# Patient Record
Sex: MALE | Race: WHITE | NOT HISPANIC OR LATINO | Employment: OTHER | URBAN - METROPOLITAN AREA
[De-identification: names, ages, dates, MRNs, and addresses within clinical notes are randomized per-mention and may not be internally consistent; named-entity substitution may affect disease eponyms.]

---

## 2017-02-03 ENCOUNTER — PATIENT OUTREACH (OUTPATIENT)
Dept: HEALTH INFORMATION MANAGEMENT | Facility: OTHER | Age: 82
End: 2017-02-03

## 2017-02-03 NOTE — PROGRESS NOTES
Outcome: Scheduled and mailed reminder letter -- Demographics verified -- [facility] AWV    Attempt # Final    Annual Wellness Visit Scheduling  Scheduling Status: Scheduled      MyChart Activation:  Already Active

## 2017-03-29 ENCOUNTER — HOSPITAL ENCOUNTER (OUTPATIENT)
Dept: RADIOLOGY | Facility: MEDICAL CENTER | Age: 82
End: 2017-03-29

## 2017-03-29 ENCOUNTER — HOSPITAL ENCOUNTER (EMERGENCY)
Facility: MEDICAL CENTER | Age: 82
End: 2017-03-29
Attending: EMERGENCY MEDICINE
Payer: MEDICARE

## 2017-03-29 VITALS
RESPIRATION RATE: 14 BRPM | DIASTOLIC BLOOD PRESSURE: 71 MMHG | BODY MASS INDEX: 33.8 KG/M2 | SYSTOLIC BLOOD PRESSURE: 180 MMHG | HEIGHT: 64 IN | WEIGHT: 198 LBS | OXYGEN SATURATION: 91 % | HEART RATE: 76 BPM | TEMPERATURE: 98.6 F

## 2017-03-29 PROCEDURE — 99284 EMERGENCY DEPT VISIT MOD MDM: CPT

## 2017-03-29 RX ORDER — WARFARIN SODIUM 3 MG/1
3-4 TABLET ORAL
COMMUNITY
End: 2017-11-16

## 2017-03-29 RX ORDER — LISINOPRIL 10 MG/1
10 TABLET ORAL
COMMUNITY
End: 2018-08-20

## 2017-03-29 RX ORDER — FLUTICASONE PROPIONATE 50 MCG
1 SPRAY, SUSPENSION (ML) NASAL
COMMUNITY
End: 2018-10-17 | Stop reason: SDUPTHER

## 2017-03-29 ASSESSMENT — LIFESTYLE VARIABLES: DO YOU DRINK ALCOHOL: NO

## 2017-03-29 ASSESSMENT — ENCOUNTER SYMPTOMS
CONSTITUTIONAL NEGATIVE: 1
CARDIOVASCULAR NEGATIVE: 1
GASTROINTESTINAL NEGATIVE: 1
EYES NEGATIVE: 1
NEUROLOGICAL NEGATIVE: 1
TINGLING: 0
MUSCULOSKELETAL NEGATIVE: 1
RESPIRATORY NEGATIVE: 1
PSYCHIATRIC NEGATIVE: 1

## 2017-03-29 ASSESSMENT — PAIN SCALES - GENERAL: PAINLEVEL_OUTOF10: 5

## 2017-03-29 NOTE — ED AVS SNAPSHOT
3/29/2017          Devan Bean Jr.  [address] [place] [address] [place] [address]    Dear Devan:    [facility] wants to ensure your discharge home is safe and you or your loved ones have had all your questions answered regarding your care after you leave the hospital.    You may receive a telephone call within two days of your discharge.  This call is to make certain you understand your discharge instructions as well as ensure we provided you with the best care possible during your stay with us.     The call will only last approximately 3-5 minutes and will be done by a nurse.    Once again, we want to ensure your discharge home is safe and that you have a clear understanding of any next steps in your care.  If you have any questions or concerns, please do not hesitate to contact us, we are here for you.  Thank you for choosing [facility] for your healthcare needs.    Sincerely,    Rick Shah    [facility]

## 2017-03-29 NOTE — ED AVS SNAPSHOT
Managed Methods Access Code: Activation code not generated  Current Managed Methods Status: Active    Znap[employer]art  A secure, online tool to manage your health information     [employer]’s Managed Methods® is a secure, online tool that connects you to your personalized health information from the privacy of your home -- day or night - making it very easy for you to manage your healthcare. Once the activation process is completed, you can even access your medical information using the Managed Methods jean, which is available for free in the Apple Jean store or Google Play store.     Managed Methods provides the following levels of access (as shown below):   My Chart Features   [facility] Primary Care Doctor [facility]  Specialists [facility]  Urgent  Care Non-[facility]  Primary Care  Doctor   Email your healthcare team securely and privately 24/7 X X X X   Manage appointments: schedule your next appointment; view details of past/upcoming appointments X      Request prescription refills. X      View recent personal medical records, including lab and immunizations X X X X   View health record, including health history, allergies, medications X X X X   Read reports about your outpatient visits, procedures, consult and ER notes X X X X   See your discharge summary, which is a recap of your hospital and/or ER visit that includes your diagnosis, lab results, and care plan. X X       How to register for Managed Methods:  1. Go to  [email or web address].  2. Click on the Sign Up Now box, which takes you to the New Member Sign Up page. You will need to provide the following information:  a. Enter your Managed Methods Access Code exactly as it appears at the top of this page. (You will not need to use this code after you’ve completed the sign-up process. If you do not sign up before the expiration date, you must request a new code.)   b. Enter your date of birth.   c. Enter your home email address.   d. Click Submit, and follow the next screen’s instructions.  3. Create a Managed Methods ID. This will  be your [employer]ipit login ID and cannot be changed, so think of one that is secure and easy to remember.  4. Create a Yipit password. You can change your password at any time.  5. Enter your Password Reset Question and Answer. This can be used at a later time if you forget your password.   6. Enter your e-mail address. This allows you to receive e-mail notifications when new information is available in Yipit.  7. Click Sign Up. You can now view your health information.    For assistance activating your Yipit account, call [phone]

## 2017-03-29 NOTE — H&P
Physician H&P    Patient ID:  Devan Bean Jr.  [id number]  84 y.o. male  2/19/1933    History:  Primary Diagnosis: GLF trauma with cervical spine fracture    HPI:  Devan is an 84 year-old man who presents following a Ground Level Fall.  The patient reports that he was attempting to spray for ants wherein he tripped over a metal bar.  The patient states that he did not lose consciousness associated with the fall.  He denies any significant pain associated with the fall.  The patient is treated with warfarin    Past Medical History:  has a past medical history of Sleep apnea; Osteoarthritis; Hypertension; Gout; GERD (gastroesophageal reflux disease); Dyslipidemia; Factor 5 Leiden mutation, heterozygous (CMS-[facility]); Dental disorder; and Pain.  Past Surgical History:  has past surgical history that includes laminotomy (08012010); revise median n/carpal tunnel surg (Bilateral); and cyst excision (Right, 12/14/2015).  Past Social History:  reports that he quit smoking about 63 years ago. His smoking use included Cigarettes. He has a 10 pack-year smoking history. He has never used smokeless tobacco. He reports that he drinks about 8.4 oz of alcohol per week. He reports that he does not use illicit drugs.  Past Family History:   Family History   Problem Relation Age of Onset   • Heart Disease Mother    • Lung Disease Mother    • Diabetes Maternal Grandmother      Allergies: Pcn and Indocin    Current Medications:  Prior to Admission medications    Medication Sig Start Date End Date Taking? Authorizing Provider   fluticasone (FLONASE) 50 MCG/ACT nasal spray Spray 1 Spray in nose 1 time daily as needed (for allergies).   Yes Not In System Provider   lisinopril (PRINIVIL) 10 MG Tab Take 10 mg by mouth every bedtime.   Yes Not In System Provider   warfarin (COUMADIN) 3 MG Tab Take 3-4 mg by mouth every bedtime. 3/22-3/24 -HOLD, 3/25-4mg, 3/26-4mg, 3/27-4mg, 3/28-3mg   Yes Not In System Provider   hydrocodone-acetaminophen (NORCO)  "5-325 MG Tab per tablet Take 1 Tab by mouth 2 times a day as needed. 3/7/17   Sapna Randall D.O.   vitamin D (CHOLECALCIFEROL) 1000 UNIT Tab Take 1,000 Units by mouth every day.    Not In System Provider   cyanocobalamin (VITAMIN B-12) 500 MCG Tab Take 1,000 mcg by mouth every day.    Not In System Provider   Multiple Vitamins-Minerals (OCUVITE) Tab Take 1 Tab by mouth every day.    Not In System Provider   simvastatin (ZOCOR) 10 MG Tab Take 1 Tab by mouth every bedtime. 1/11/17   Sapna Randall D.O.   colchicine (COLCRYS) 0.6 MG Tab Take 1 Tab by mouth every day. 10/24/16   Sapna Randall D.O.   predniSONE (DELTASONE) 5 MG Tab TAKE 1 TABLET BY MOUTH EVERY DAY 9/26/16   Sapna Randall D.O.   esomeprazole (NEXIUM) 40 MG delayed-release capsule TAKE 1 CAP BY MOUTH EVERY DAY. 3/27/16   Sapna Randall D.O.   Omega-3 Fatty Acids (FISH OIL) 1000 MG CPDR Take 1,000 mg by mouth every day.    Sapna Randall D.O.   Ascorbic Acid (VITAMIN C CR) 1000 MG TBCR Take 1,000 mg by mouth every day.    Sapna Randall D.O.   Magnesium 400 MG CAPS Take 400 mg by mouth every day.    Not In System Provider       Review of Systems:  Review of Systems   Constitutional: Negative.    HENT: Negative.    Eyes: Negative.    Respiratory: Negative.    Cardiovascular: Negative.    Gastrointestinal: Negative.    Genitourinary: Negative.    Musculoskeletal: Negative.    Skin: Negative.    Neurological: Negative.    Endo/Heme/Allergies: Negative.    Psychiatric/Behavioral: Negative.      Blood pressure 180/71, pulse 83, temperature 37 °C (98.6 °F), resp. rate 16, height 1.626 m (5' 4\"), weight 89.812 kg (198 lb), SpO2 94 %.    Physical Examination:  Physical Exam   Constitutional: He is oriented to person, place, and time. He appears well-developed and well-nourished.   HENT:   Head: Normocephalic.   Small amount of abrasion along glabela and left temporal region   Eyes: EOM are normal. Pupils are equal, round, and reactive to light.   Neck: " Normal range of motion.   Neck for neck discomfort   Cardiovascular: Normal rate and regular rhythm.    Pulmonary/Chest: Effort normal.   Abdominal: Soft. He exhibits no distension. There is no tenderness.   Musculoskeletal: Normal range of motion. He exhibits no edema.   Abrasion to right hand that are wrapped in dressing  -5/5 motor strength throughout  -Intact to LT throughout  -No protonator drift   Neurological: He is alert and oriented to person, place, and time. He has normal reflexes. No cranial nerve deficit.       Impression:  GLF with finding of inferior Left C6 articulating process fracture.  This appears to have some degree of cortication along the fracture edges and may represent a subacute fracture rather than an acute fracture that occurred today.    Plan:  1. Cervical Fracture, subaxial spine, L C6 inferior articulating process, acute versus subacute.      -place patient in [place] J collar and plan to follow-up in 4 weeks  2. Anticoagulation with elevated INR, hold additional dosing of warfarin and follow-up with PCP/coumadin clinic regarding dose adjustment    Pre Procedure Assessment:  <EXAMSHORT2>      Pre Procedure update:   No changes.    Eduardo RIVKA Olivas  3/29/2017

## 2017-03-29 NOTE — ED NOTES
BIB [facility] c/o neck pain, patient exp a mglf ( tripped over a piece of metal on floor) in his garage this morning, no LOC, went to [facility] where a C6 fx was noted, denies any numbness or tingling to upper ext, is in c collar, o2 at 2 l w o2 sat 91-94%

## 2017-03-29 NOTE — ED PROVIDER NOTES
ED Provider Note    Scribed for Rajesh Caputo D.O. by Leon Retana. 3/29/2017  2:40 PM    Primary care provider: Sapna Randall D.O.  Means of arrival: Ambulance  History obtained from: patient  History limited by: none    CHIEF COMPLAINT  Chief Complaint   Patient presents with   • Neck Injury       HPI  Devan Bean Jr. is a 84 y.o. male who presents to the Emergency Department for evaluation neck pain secondary to a mechanical ground level fall, which occurred approximately 6 hours prior to arrival. Patient reports he tripped over a piece of metal on the floor in his garage. He also complains of associated right elbow pain and reports that he cannot straighten his elbow. Patient denies any numbness or tingling.  His tetanus is up to date. He has no exacerbating or alleviating factors.      REVIEW OF SYSTEMS  Review of Systems   Neurological: Negative for tingling.   All other systems reviewed and are negative.  C.     PAST MEDICAL HISTORY   has a past medical history of Sleep apnea; Osteoarthritis; Hypertension; Gout; GERD (gastroesophageal reflux disease); Dyslipidemia; Factor 5 Leiden mutation, heterozygous (CMS-[facility]); Dental disorder; and Pain.    SURGICAL HISTORY   has past surgical history that includes laminotomy (08012010); revise median n/carpal tunnel surg (Bilateral); and cyst excision (Right, 12/14/2015).    SOCIAL HISTORY  Social History   Substance Use Topics   • Smoking status: Former Smoker -- 1.00 packs/day for 10 years     Types: Cigarettes     Quit date: 11/10/1953   • Smokeless tobacco: Never Used   • Alcohol Use: 8.4 oz/week     14 Glasses of wine per week      History   Drug Use No       FAMILY HISTORY  Family History   Problem Relation Age of Onset   • Heart Disease Mother    • Lung Disease Mother    • Diabetes Maternal Grandmother        CURRENT MEDICATIONS  Home Medications     **Home medications have not yet been reviewed for this encounter**          ALLERGIES  Allergies  "  Allergen Reactions   • Pcn [Penicillins] Rash   • Indocin [Indometacin Sodium] Rash       PHYSICAL EXAM  VITAL SIGNS: /71 mmHg  Pulse 83  Temp(Src) 37 °C (98.6 °F)  Resp 16  Ht 1.626 m (5' 4\")  Wt 89.812 kg (198 lb)  BMI 33.97 kg/m2  SpO2 94%    Constitutional: Well developed, well nourished. No acute distress.  HEENT: Normocephalic. Superficial abrasions to the forehead and zygoma. Posterior pharynx clear and moist.  Eyes:  EOMI. Normal sclera.  Neck: C collar in place. Midline tenderness C-5 C-6.   Chest/Pulmonary: clear to ausculation. Symmetrical expansion.   Cardio: Regular rate and rhythm with no murmur.   Abdomen: Soft, nontender. No peritoneal signs. No guarding. No palpable masses.  Back: No CVA tenderness, nontender midline, no step offs.  Musculoskeletal: No deformity, no edema, neurovascular intact. Superficial abrasion to the left knee.   Neuro: Equal  bilaterally. Dorsi plantar flexion extension 4/5 bilateral lower extremities. Clear speech, appropriate, cooperative, cranial nerves II-XII grossly intact.  Skin skin tears to the dorsum of right hand and left elbow. Non suturable.   Psych: Normal mood and affect    DIAGNOSTIC STUDIES / PROCEDURES    RADIOLOGY  OUTSIDE IMAGES-DX LOWER EXTREMITY, LEFT   Preliminary Result      OUTSIDE IMAGES-DX UPPER EXTREMITY, RIGHT   Preliminary Result      OUTSIDE IMAGES-CT ABDOMEN /PELVIS   Preliminary Result      OUTSIDE IMAGES-CT CHEST   Preliminary Result      OUTSIDE IMAGES-CT FACE   Preliminary Result      OUTSIDE IMAGES-CT CERVICAL SPINE   Preliminary Result      OUTSIDE IMAGES-CT HEAD   Preliminary Result        The radiologist's interpretation of all radiological studies have been reviewed by me.    COURSE & MEDICAL DECISION MAKING  Pertinent Labs & Imaging studies reviewed. (See chart for details)          2:40 PM - Patient seen and examined at bedside.     2:42 PM - Obtained and reviewed past labs and CTs which indicated he takes Coumadin, " INR 5.1. Left knee X-ray negative. Right elbow X-ray negative. CT abdomen pelvis is also negative. CT chest is negative. CT of face is negative. CT head is negative. CT C spine shows a non displaced fracture involving inferior articular facet of C-6. The pedicle and lamina appear intact and right sided posterior elements of C-6 appear intact.     3:24 PM - Further reviewed imaging results.     3:27 PM Paged Dr. Olivas (Neurosurgery).     3:35 PM - I discussed the patient's case and the above findings with Dr. Olivas (Neurosurgery) who agrees to see the patient.     4:30 PM - Dr. Olivas has seen the pt.  He states pt can go home in [facility], and he will see this week.       5:21 PM-The pt has been up, ambulated, and has no dizziness or weakness.  No neuro deficits.  He is comfortable in going home.     DISPOSITION:  Pt will be discharged.       FINAL IMPRESSION  1. Compressed spine fracture, initial encounter (CMS-[facility])          Leon ADLER (Scribe), am scribing for, and in the presence of, Rajesh Caputo D.O..    Electronically signed by: Leon Retana (Scribe), 3/29/2017    Rajesh ADLER D.O. personally performed the services described in this documentation, as scribed by Leon Retana in my presence, and it is both accurate and complete.    The note accurately reflects work and decisions made by me.  Rajehs Caputo  3/29/2017  5:48 PM

## 2017-03-29 NOTE — ED NOTES
" 1/2\" Avulsion to bridge of nose , 1/2\" avulsion to lateral L eyebrow,  2 skin tears to  of R hand, abrasion to L knee,skin tear to L el;bow, and 1/4\" lac to L fifth digit middle joint  "

## 2017-03-29 NOTE — ED AVS SNAPSHOT
Home Care Instructions                                                                                                                Devan Bean Jr.   MRN: [id number]    Department:  [facility], Emergency Dept   Date of Visit:  3/29/2017            [facility], Emergency Dept    [address]    [place] [address]    Phone:  [phone]      You were seen by     1. Juarez Santos M.D.    2. Rajesh Caputo D.O.      Your Diagnosis Was     Compressed spine fracture, initial encounter (CMS-[facility])     M48.50XA       Follow-up Information     1. Follow up with Eduardo Olivas M.D..    Specialty:  Neurosurgery    Contact information    [address]  [address]  [facility] [id number] [phone]        Medication Information     Review all of your home medications and newly ordered medications with your primary doctor and/or pharmacist as soon as possible. Follow medication instructions as directed by your doctor and/or pharmacist.     Please keep your complete medication list with you and share with your physician. Update the information when medications are discontinued, doses are changed, or new medications (including over-the-counter products) are added; and carry medication information at all times in the event of emergency situations.               Medication List      ASK your doctor about these medications        Instructions    Morning Afternoon Evening Bedtime    Ascorbic Acid 1000 MG Tbcr        Take 1,000 mg by mouth every day.   Dose:  1000 mg                        colchicine 0.6 MG Tabs   Commonly known as:  COLCRYS        Take 1 Tab by mouth every day.   Dose:  0.6 mg                        cyanocobalamin 500 MCG Tabs   Commonly known as:  VITAMIN B-12        Take 1,000 mcg by mouth every day.   Dose:  1000 mcg                        esomeprazole 40 MG delayed-release capsule   Commonly known as:  NEXIUM        TAKE 1 CAP BY MOUTH EVERY DAY.                           fish oil 1000 MG Cpdr EC softgel capsule        Take 1,000 mg by mouth every day.   Dose:  1000 mg                        fluticasone 50 MCG/ACT nasal spray   Commonly known as:  FLONASE        Spray 1 Spray in nose 1 time daily as needed (for allergies).   Dose:  1 Spray                        hydrocodone-acetaminophen 5-325 MG Tabs per tablet   Commonly known as:  NORCO        Take 1 Tab by mouth 2 times a day as needed.   Dose:  1 Tab                        lisinopril 10 MG Tabs   Commonly known as:  PRINIVIL        Take 10 mg by mouth every bedtime.   Dose:  10 mg                        Magnesium 400 MG Caps        Take 400 mg by mouth every day.   Dose:  400 mg                        OCUVITE Tabs        Take 1 Tab by mouth every day.   Dose:  1 Tab                        predniSONE 5 MG Tabs   Commonly known as:  DELTASONE        TAKE 1 TABLET BY MOUTH EVERY DAY                        simvastatin 10 MG Tabs   Commonly known as:  ZOCOR        Take 1 Tab by mouth every bedtime.   Dose:  10 mg                        vitamin D 1000 UNIT Tabs   Commonly known as:  cholecalciferol        Take 1,000 Units by mouth every day.   Dose:  1000 Units                        warfarin 3 MG Tabs   Commonly known as:  COUMADIN        Take 3-4 mg by mouth every bedtime. 3/22-3/24 -HOLD, 3/25-4mg, 3/26-4mg, 3/27-4mg, 3/28-3mg   Dose:  3-4 mg                                Procedures and tests performed during your visit     OUTSIDE IMAGES-CT ABDOMEN /PELVIS    OUTSIDE IMAGES-CT CERVICAL SPINE    OUTSIDE IMAGES-CT CHEST    OUTSIDE IMAGES-CT FACE    OUTSIDE IMAGES-CT HEAD    OUTSIDE IMAGES-DX LOWER EXTREMITY, LEFT    OUTSIDE IMAGES-DX UPPER EXTREMITY, RIGHT    RIGID C COLLAR PLACEMENT            Patient Information     Patient Information    Following emergency treatment: all patient requiring follow-up care must return either to a private physician or a clinic if your condition worsens before you are able to obtain further  medical attention, please return to the emergency room.     Billing Information    At [facility], we work to make the billing process streamlined for our patients.  Our Representatives are here to answer any questions you may have regarding your hospital bill.  If you have insurance coverage and have supplied your insurance information to us, we will submit a claim to your insurer on your behalf.  Should you have any questions regarding your bill, we can be reached online or by phone as follows:  Online: You are able pay your bills online or live chat with our representatives about any billing questions you may have. We are here to help Monday - Friday from 8:00am to 7:30pm and 9:00am - 12:00pm on Saturdays.  Please visit [email or web address]  for more information.   Phone:  [phone] or [phone]    Please note that your emergency physician, surgeon, pathologist, radiologist, anesthesiologist, and other specialists are not employed by [facility] and will therefore bill separately for their services.  Please contact them directly for any questions concerning their bills at the numbers below:     Emergency Physician Services:  [phone]  [facility]:  [phone]  [facility]:  [phone]  [facility]:  [phone]    1. Your final bill may vary from the amount quoted upon discharge if all procedures are not complete at that time, or if your doctor has additional procedures of which we are not aware. You will receive an additional bill if you return to the Emergency Department at [facility] for suture removal regardless of the facility of which the sutures were placed.     2. Please arrange for settlement of this account at the emergency registration.    3. All self-pay accounts are due in full at the time of treatment.  If you are unable to meet this obligation then payment is expected within 4-5 days.     4. If you  have had radiology studies (CT, X-ray, Ultrasound, MRI), you have received a preliminary result during your emergency department visit. Please contact the radiology department [phone] to receive a copy of your final result. Please discuss the Final result with your primary physician or with the follow up physician provided.     Crisis Hotline:  [place] Crisis Hotline:  5-424-IENMPFV or 1-204.311.5223  [place] Crisis Hotline:    1-620.934.4829 or 475-300-3524         ED Discharge Follow Up Questions    1. In order to provide you with very good care, we would like to follow up with a phone call in the next few days.  May we have your permission to contact you?     YES /  NO    2. What is the best phone number to call you? (       )_____-__________    3. What is the best time to call you?      Morning  /  Afternoon  /  Evening                   Patient Signature:  ____________________________________________________________    Date:  ____________________________________________________________

## 2017-03-30 NOTE — DISCHARGE INSTRUCTIONS
Vertebral Fracture  A vertebral fracture is a break in one of the bones that make up the spine (vertebrae). The vertebrae are stacked on top of each other to form the spinal column. They support the body and protect the spinal cord. The vertebral column has an upper part (cervical spine), a middle part (thoracic spine), and a lower part (lumbar spine). Most vertebral fractures occur in the thoracic spine or lumbar spine.  There are three main types of vertebral fractures:  · Flexion fracture. This happens when vertebrae collapse. Vertebrae can collapse:  ¨ In the front (compression fracture). This type of fracture is common in people who have a condition that causes their bones to be weak and brittle (osteoporosis). The fracture can make a person lose height.  ¨ In the front and back (axial burst fracture).  · Extension fracture. This happens when an external force pulls apart the vertebrae.  · Rotation fracture. This happens when the spine bends extremely in one direction. This type can cause a piece of a vertebra to break off (transverse process fracture) or move out of its normal position (fracture dislocation). This type of fracture has a high risk for spinal cord injury.  Vertebral fractures can range from mild to very severe. The most severe types are those that cause the broken bones to move out of place (unstable) and those that injure or press on the spinal cord.  CAUSES  This condition is usually caused by a forceful injury. This type of injury commonly results from:  · Car accidents.  · Falling or jumping from a great height.  · Collisions in contact sports.  · Violent acts, such as an assault or a gunshot wound.  RISK FACTORS  This injury is more likely to happen to people who:  · Have osteoporosis.  · Participate in contact sports.  · Are in situations that could result in falls or other violent injuries.  SYMPTOMS  Symptoms of this injury depend on the location and the type of fracture. The most common  symptom is back pain that gets worse with movement. You may also have trouble standing or walking. If a fracture has damaged your spinal cord or is pressing on it, you may also have:  · Numbness.  · Tingling.  · Weakness.  · Loss of movement.  · Loss of bowel or bladder control.  DIAGNOSIS  This injury may be diagnosed based on symptoms, medical history, and a physical exam. You may also have imaging tests to confirm the diagnosis. These may include:  · Spine X-ray.  · CT scan.  · MRI.  TREATMENT  Treatment for this injury depends on the type of fracture. If your fracture is stable and does not affect your spinal cord, it may heal with nonsurgical treatment, such as:  · Taking pain medicine.  · Wearing a cast or a brace.  · Doing physical therapy exercises.  If your vertebral fracture is unstable or it affects your spinal cord, you may need surgical treatment, such as:  · Laminectomy. This procedure involves removing the part of a vertebra that is pushing on the spinal cord (spinal decompression surgery). Bone fragments may also be removed.  · Spinal fusion. This procedure is used to stabilize an unstable fracture. Vertebrae may be joined together with a piece of bone from another part of your body (graft) and held in place with rods, plates, or screws.  · Vertebroplasty. In this procedure, bone cement is used to rebuild collapsed vertebrae.  HOME CARE INSTRUCTIONS  General Instructions  · Take medicines only as directed by your health care provider.  · Do not drive or operate heavy machinery while taking pain medicine.  · If directed, apply ice to the injured area:  ¨ Put ice in a plastic bag.  ¨ Place a towel between your skin and the bag.  ¨ Leave the ice on for 30 minutes every two hours at first. Then apply the ice as needed.  · Wear your neck brace or back brace as directed by your health care provider.  · Do not drink alcohol. Alcohol can interfere with your treatment.  · Keep all follow-up visits as directed  by your health care provider. This is important. It can help to prevent permanent injury, disability, and long-lasting (chronic) pain.  Activity  · Stay in bed (on bed rest) only as directed by your health care provider. Being on bed rest for too long can make your condition worse.  · Return to your normal activities as directed by your health care provider. Ask what activities are safe for you.  · Do exercises to improve motion and strength in your back (physical therapy), as recommended by your health care provider.    · Exercise regularly as directed by your health care provider.  SEEK MEDICAL CARE IF:  · You have a fever.  · You develop a cough that makes your pain worse.  · Your pain medicine is not helping.  · Your pain does not get better over time.  · You cannot return to your normal activities as planned or expected.  SEEK IMMEDIATE MEDICAL CARE IF:  · Your pain is very bad and it suddenly gets worse.  · You are unable to move any body part (paralysis) that is below the level of your injury.  · You have numbness, tingling, or weakness in any body part that is below the level of your injury.  · You cannot control your bladder or bowels.     This information is not intended to replace advice given to you by your health care provider. Make sure you discuss any questions you have with your health care provider.     Document Released: 01/25/2006 Document Revised: 05/03/2016 Document Reviewed: 12/23/2015  [employer] Patient Education ©2016 [employer]

## 2017-03-30 NOTE — ED NOTES
IV dced, given discharge instructions and follow up, family at bedside wanting to talk to Dr Caputo, Dr. Caputo aware

## 2018-01-04 PROBLEM — E66.9 OBESITY (BMI 30-39.9): Status: ACTIVE | Noted: 2018-01-04

## 2018-08-20 PROBLEM — M1A.9XX1 TOPHACEOUS GOUT: Status: ACTIVE | Noted: 2018-08-20

## 2018-08-20 PROBLEM — M48.10 DISH (DIFFUSE IDIOPATHIC SKELETAL HYPEROSTOSIS): Status: ACTIVE | Noted: 2018-08-20

## 2019-02-05 ENCOUNTER — PATIENT OUTREACH (OUTPATIENT)
Dept: HEALTH INFORMATION MANAGEMENT | Facility: OTHER | Age: 84
End: 2019-02-05

## 2019-02-05 NOTE — PROGRESS NOTES
Outcome: No call made phone number not in service        Please transfer to Patient Outreach Team at [phone] when patient returns call.     Attempt # 1

## 2019-02-13 NOTE — PROGRESS NOTES
Outcome: Left Message    Please transfer to Patient Outreach Team at [phone] when patient returns call.    Attempt # 2

## 2019-02-27 NOTE — PROGRESS NOTES
Outcome: Left Message    Please transfer to Patient Outreach Team at [phone] when patient returns call.    Attempt # 3

## 2019-03-15 PROBLEM — J30.89 ENVIRONMENTAL AND SEASONAL ALLERGIES: Status: ACTIVE | Noted: 2019-03-15

## 2020-06-13 PROBLEM — N18.9 CKD (CHRONIC KIDNEY DISEASE): Status: ACTIVE | Noted: 2020-06-13

## 2020-06-13 PROBLEM — S22.079A CLOSED FRACTURE OF TENTH THORACIC VERTEBRA (HCC): Status: ACTIVE | Noted: 2020-06-13

## 2020-09-03 ENCOUNTER — NON-PROVIDER VISIT (OUTPATIENT)
Dept: NEUROLOGY | Facility: MEDICAL CENTER | Age: 85
End: 2020-09-03
Payer: MEDICARE

## 2020-09-03 DIAGNOSIS — G62.9 PERIPHERAL NERVE DISORDER: ICD-10-CM

## 2020-09-03 DIAGNOSIS — R20.0 NUMBNESS AND TINGLING: ICD-10-CM

## 2020-09-03 DIAGNOSIS — R20.2 NUMBNESS AND TINGLING: ICD-10-CM

## 2020-09-03 PROCEDURE — 95913 NRV CNDJ TEST 13/> STUDIES: CPT | Performed by: PSYCHIATRY & NEUROLOGY

## 2020-09-03 PROCEDURE — 95886 MUSC TEST DONE W/N TEST COMP: CPT | Mod: LT | Performed by: PSYCHIATRY & NEUROLOGY

## 2020-09-03 NOTE — PROCEDURES
"NERVE CONDUCTION STUDIES AND ELECTROMYOGRAPHY REPORT  [facility]  09/03/2020           IMPRESSION:  This is a significantly abnormal electrodiagnostic study due to:  1. Widespread subacute on chronic active denervation and reinnervation in multiple left cervical and lumbosacral myotomes.  This can be seen in multilevel degenerative disc disease of the cervical and lumbosacral spine.  In the proper clinical setting, this can also be seen in motor neuron disease.  2. Unobtainable responses in the bilateral lower extremities which can be evidence of a severe sensorimotor polyneuropathy, significant degenerative disc disease of the lumbosacral spine, or superimposed etiologies.    3. Suspected severe left median neuropathy at the wrist (carpal tunnel syndrome).  4. Cannot rule out underlying left ulnar neuropathy.    There is no evidence of myopathy.  Recommend clinical correlation of the above and further imaging if deemed appropriate.    Saima Buitrago MD  Neurology - Neurophysiology  [facility]        REASON FOR REFERRAL:  Mr. Brian Bean Jr. 87 y.o. referred by Dr. Elen Dawn for an evaluation of progressive numbness and pain in his feet for the last year.  This is associated with progressive weakness in the bilateral upper and lower extremities also over the past year.  Patient has a history of lumbar fusion and degenerative disc disease of the spine.  He is anticoagulated.    Height: 5'6\"  Weight: 180 lbs    Symptom focused neurological exam shows 4-5 strength in the left lower extremity.  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  There is significant swelling in the distal lower extremity as well.      ELECTRODIAGNOSTIC EXAMINATION:  Nerve conduction studies (NCS) and electromyography (EMG) are utilized to evaluate direct or indirect damage to the peripheral nervous system. NCS are performed to measure the nerve(s) " response(s) to electrostimulation across a given nerve segment. EMG evaluates the passive and active electrical activity of the muscle(s) in question.  Muscles are innervated by specific peripheral nerves and roots. Often times, several nerves the muscle to be examined in order to determine the presence or absence of the disease process. Furthermore, nerves and muscles may need to be tested in a [id number] comparison, as well as in additional extremities, as this may be crucial in characterizing the extent of the disease process, which may be diffuse or isolated and of varying degree of severity. The extent of the neurodiagnostic exam is justified as it may help arrive to a proper diagnosis, which ultimately may contribute to better management of the patient. Therefore, the nerves to muscles examined during the study were medically necessary.    Unless otherwise noted, temperature of the extremity(s) study was monitored before and during the examination and was difficulty to maintain between 32 and 36 degrees C for the upper extremities, and between 30 and 36 degrees C for the lower extremities. The patient tolerated testing well, without any complications.       NERVE CONDUCTION STUDY SUMMARY:  Selected nerves of the bilateral lower extremity and left upper extremity are studied.    Unobtainable left median sensory response.  Abnormal left median motor response due to significantly decreased amplitude, prolonged distal latency, and slowing.  Unobtainable left ulnar sensory response.  Abnormal left ulnar motor response at the ADM due to low amplitude, prolonged distal latency, and slowing.  Normal left radial sensory response.  Unobtainable bilateral sural sensory responses.  Unobtainable bilateral common peroneal motor responses at the extensor digitorum brevis.  Abnormal bilateral common peroneal motor responses at the tibialis anterior due to low amplitude and slowing.  Decreased amplitude of proximal  stimulation is suspected to be technical in nature.  Unobtainable bilateral tibial motor responses at the abductor hallucis brevis.      NEEDLE EMG SUMMARY:  Concentric needle study of selected left upper and lower extremity muscles is performed.     Insertion activity is increased in the left tibialis anterior, medial gastrocnemius, vastus lateralis and medialis, gluteus medius, biceps, flexor carpi ulnaris, APB due to the presence of fibrillation potentials and positive waves.  There are few CRDs appreciated in the left FDI and APB.  Large amplitude prolonged duration motor unit potentials are appreciated in the left anterior tibialis, medial gastrocnemius, first dorsal interosseous, triceps, deltoid, flexor carpi ulnaris, abductor pollicis brevis.  Large amplitude prolonged duration motor unit potentials are appreciated in the left vastus lateralis, gluteus medius and biceps with evidence of polyphasic motor units.  Otherwise ith activation, there are normal morphology (amplitude/duration) motor unit action potentials firing with normal recruitment in remaining muscles tested.       PATIENT DATA TABLES  Nerve Conduction Studies     Stim Site NR Onset (ms) Norm Onset (ms) O-P Amp (µV) Norm O-P Amp Site1 Site2 Delta-P (ms) Dist (cm) Phillip (m/s) Norm Phillip (m/s)   Left Median Anti Sensory (2nd Digit)   Wrist *NR  <3.8  >10 Wrist 2nd Digit  14.0  >50   Left Radial Anti Sensory (Base 1st Digit)   Wrist    1.6 <2.8 10.6 >10 Wrist Base 1st Digit 2.7 10.0 *37 >50   Left Sural Anti Sensory (Lat Mall)   Calf *NR  <4.6  >3 Calf Lat Mall  14.0  >40   Right Sural Anti Sensory (Lat Mall)   Calf *NR  <4.6  >3 Calf Lat Mall  14.0  >40   Left Ulnar Anti Sensory (5th Digit)   Wrist *NR  <3.2  >5 Wrist 5th Digit  14.0  >50        Stim Site NR Onset (ms) Norm Onset (ms) O-P Amp (mV) Norm O-P Amp Site1 Site2 Delta-0 (ms) Dist (cm) Phillip (m/s) Norm Phillip (m/s)   Left Median Motor (Abd Poll Brev)   Wrist    *9.0 <4 *1.0 >5 Elbow Wrist 6.3 22.0  *35 >50   Elbow    15.3  0.7          Left Peroneal EDB Motor (Ext Dig Brev)   Ankle *NR  <6  >2.5 B Fib Ankle  0.0  >40   B Fib *NR             Poplt *NR             Right Peroneal EDB Motor (Ext Dig Brev)   Ankle *NR  <6  >2.5 B Fib Ankle  0.0  >40   B Fib *NR     Poplt B Fib  0.0     Poplt *NR             Left Peroneal TA Motor (AntTibialis)   Fib Head    4.5 <4.5 1.5 3 Poplit Fib Head 5.3 10.0 *19 >40   Poplit    9.8  0.6          Right Peroneal TA Motor (AntTibialis)   Fib Head    4.0 <4.5 1.3 3 Poplit Fib Head 4.2 10.0 *24 >40   Poplit    8.2  0.6          Left Tibial Motor (Abd Allen Brev)   Ankle *NR  <6  >4 Knee Ankle  0.0  >40   Knee *NR             Right Tibial Motor (Abd Allen Brev)   Ankle *NR  <6  >4 Knee Ankle  0.0  >40   Knee *NR             Left Ulnar Motor (Abd Dig Min)   Wrist    *4.0 <3.1 *6.7 >7 B Elbow Wrist 6.2 20.0 *32 >50   B Elbow    10.2  5.3  A Elbow B Elbow 2.7 10.0 37    A Elbow    12.9  4.5                                             Electromyography     Side Muscle Nerve Root Ins Act Fibs Psw Amp Dur Poly Recrt Int Pat Comment   Left AntTibialis Dp Br Fibular L4-5 *Incr *4+ *4+ *Incr *>12ms 0 *Reduced *50%    Left Gastroc Tibial S1-2 *Incr *1+ *1+ *Incr *>12ms 0 *Reduced *50%    Left VastusLat Femoral L2-4 *Incr *1+ *1+ *Incr *>12ms *1+ *Reduced *75%    Left GluteusMed SupGluteal L5-S1 *Incr *1+ *1+ *Incr *>12ms *1+ *Reduced *75%    Left VastusMed Femoral L2-4 *Incr *2+ *2+      Difficulty activating   Left 1stDorInt Ulnar C8-T1 *Incr Nml Nml *Incr *>12ms 0 Nml Nml CRD   Left PronatorTeres Median C6-7 Nml Nml Nml Nml Nml 0 Nml Nml    Left Biceps Musculocut C5-6 *Incr *2+ *2+ *Incr *>12ms *1+ Nml Nml    Left Triceps Radial C6-7-8 Nml Nml Nml *Incr *>12ms 0 Nml Nml    Left Deltoid Axillary C5-6 Nml Nml Nml *Incr *>12ms 0 Nml Nml Difficulty relaxing   Left FlexCarpiUln Ulnar C8,T1 *Incr *4+ *4+ *Incr *>12ms 0 Nml Nml    Left Abd Poll Brev Median C8-T1 *Incr *2+ *2+ *Incr *>12ms 0 Nml Nml  CRD

## 2020-11-20 PROBLEM — G62.9 SENSORIMOTOR NEUROPATHY: Status: ACTIVE | Noted: 2020-11-20

## 2021-01-25 PROBLEM — I50.23 ACUTE ON CHRONIC SYSTOLIC CHF (CONGESTIVE HEART FAILURE) (HCC): Status: ACTIVE | Noted: 2021-01-25

## 2022-07-18 ENCOUNTER — TELEPHONE (OUTPATIENT)
Dept: HEALTH INFORMATION MANAGEMENT | Facility: OTHER | Age: 87
End: 2022-07-18